# Patient Record
Sex: FEMALE | URBAN - METROPOLITAN AREA
[De-identification: names, ages, dates, MRNs, and addresses within clinical notes are randomized per-mention and may not be internally consistent; named-entity substitution may affect disease eponyms.]

---

## 2018-02-18 ENCOUNTER — EMERGENCY (EMERGENCY)
Facility: HOSPITAL | Age: 33
LOS: 0 days | Discharge: HOME | End: 2018-02-18
Attending: EMERGENCY MEDICINE

## 2018-02-18 VITALS
TEMPERATURE: 99 F | SYSTOLIC BLOOD PRESSURE: 150 MMHG | HEART RATE: 90 BPM | DIASTOLIC BLOOD PRESSURE: 82 MMHG | RESPIRATION RATE: 16 BRPM | OXYGEN SATURATION: 99 %

## 2018-02-18 VITALS
DIASTOLIC BLOOD PRESSURE: 55 MMHG | HEART RATE: 72 BPM | SYSTOLIC BLOOD PRESSURE: 103 MMHG | TEMPERATURE: 98 F | RESPIRATION RATE: 17 BRPM

## 2018-02-18 DIAGNOSIS — Z98.2 PRESENCE OF CEREBROSPINAL FLUID DRAINAGE DEVICE: ICD-10-CM

## 2018-02-18 DIAGNOSIS — F17.210 NICOTINE DEPENDENCE, CIGARETTES, UNCOMPLICATED: ICD-10-CM

## 2018-02-18 DIAGNOSIS — R51 HEADACHE: ICD-10-CM

## 2018-02-18 DIAGNOSIS — Z98.890 OTHER SPECIFIED POSTPROCEDURAL STATES: Chronic | ICD-10-CM

## 2018-02-18 LAB
ANION GAP SERPL CALC-SCNC: 5 MMOL/L — LOW (ref 7–14)
BUN SERPL-MCNC: 8 MG/DL — LOW (ref 10–20)
CALCIUM SERPL-MCNC: 9 MG/DL — SIGNIFICANT CHANGE UP (ref 8.5–10.1)
CHLORIDE SERPL-SCNC: 106 MMOL/L — SIGNIFICANT CHANGE UP (ref 98–110)
CO2 SERPL-SCNC: 27 MMOL/L — SIGNIFICANT CHANGE UP (ref 17–32)
CREAT SERPL-MCNC: 1 MG/DL — SIGNIFICANT CHANGE UP (ref 0.7–1.5)
GLUCOSE SERPL-MCNC: 89 MG/DL — SIGNIFICANT CHANGE UP (ref 70–110)
HCT VFR BLD CALC: 39.4 % — SIGNIFICANT CHANGE UP (ref 37–47)
HGB BLD-MCNC: 12.9 G/DL — LOW (ref 14–18)
MCHC RBC-ENTMCNC: 30.2 PG — SIGNIFICANT CHANGE UP (ref 27–31)
MCHC RBC-ENTMCNC: 32.7 G/DL — SIGNIFICANT CHANGE UP (ref 32–37)
MCV RBC AUTO: 92.3 FL — HIGH (ref 81–91)
NRBC # BLD: 0 /100 WBCS — SIGNIFICANT CHANGE UP (ref 0–0)
PLATELET # BLD AUTO: 242 K/UL — SIGNIFICANT CHANGE UP (ref 130–400)
POTASSIUM SERPL-MCNC: 5.1 MMOL/L — HIGH (ref 3.5–5)
POTASSIUM SERPL-SCNC: 5.1 MMOL/L — HIGH (ref 3.5–5)
RBC # BLD: 4.27 M/UL — SIGNIFICANT CHANGE UP (ref 4.2–5.4)
RBC # FLD: 12.6 % — SIGNIFICANT CHANGE UP (ref 11.5–14.5)
SODIUM SERPL-SCNC: 138 MMOL/L — SIGNIFICANT CHANGE UP (ref 135–146)
WBC # BLD: 5.47 K/UL — SIGNIFICANT CHANGE UP (ref 4.8–10.8)
WBC # FLD AUTO: 5.47 K/UL — SIGNIFICANT CHANGE UP (ref 4.8–10.8)

## 2018-02-18 RX ORDER — SODIUM CHLORIDE 9 MG/ML
1000 INJECTION INTRAMUSCULAR; INTRAVENOUS; SUBCUTANEOUS ONCE
Qty: 0 | Refills: 0 | Status: COMPLETED | OUTPATIENT
Start: 2018-02-18 | End: 2018-02-18

## 2018-02-18 RX ORDER — METOCLOPRAMIDE HCL 10 MG
10 TABLET ORAL ONCE
Qty: 0 | Refills: 0 | Status: COMPLETED | OUTPATIENT
Start: 2018-02-18 | End: 2018-02-18

## 2018-02-18 RX ORDER — METOCLOPRAMIDE HCL 10 MG
1 TABLET ORAL
Qty: 45 | Refills: 0 | OUTPATIENT
Start: 2018-02-18 | End: 2018-03-04

## 2018-02-18 RX ADMIN — Medication 104 MILLIGRAM(S): at 13:07

## 2018-02-18 RX ADMIN — SODIUM CHLORIDE 2000 MILLILITER(S): 9 INJECTION INTRAMUSCULAR; INTRAVENOUS; SUBCUTANEOUS at 13:07

## 2018-02-18 NOTE — ED ADULT NURSE NOTE - OBJECTIVE STATEMENT
as per pt I have had migraines  on and off. pt states she has history of shunt due to ocular hypertension.

## 2018-02-18 NOTE — CONSULT NOTE ADULT - SUBJECTIVE AND OBJECTIVE BOX
HISTORY OF PRESENT ILLNESS: 31 Y/O female with Pseudo tumor cerebri, s/p VPS placement 2/3/16, patient has been doing well, started having H/A, pain behind eyes, vomiting, had a fever 101 on Friday only, was neg for flu, patient is presently feeling better, spoke to Dr Bryan and is in the ED for HCT and shunt series    PAST MEDICAL & SURGICAL HISTORY:  psuedo tumor cerebri, s/p VPS 2/3/16 by Dr Bryan    [facility]- VPS 2016, breast reduction, Csection    Allergies    Diamox (Unknown)    Intolerances    MEDICATIONS:  Seroquel 100mg HS, Klonopin 1 mg od PRN    Vital Signs Last 24 Hrs  T(C): 37.1 (18 Feb 2018 11:58), Max: 37.1 (18 Feb 2018 11:58)  T(F): 98.7 (18 Feb 2018 11:58), Max: 98.7 (18 Feb 2018 11:58)  HR: 90 (18 Feb 2018 11:58) (90 - 90)  BP: 150/82 (18 Feb 2018 11:58) (150/82 - 150/82)  BP(mean): --  RR: 16 (18 Feb 2018 11:58) (16 - 16)  SpO2: 99% (18 Feb 2018 11:58) (99% - 99%)    PHYSICAL EXAM:  Constitutional: NAD, looks healthy  Neurological: awake, alert, NAD, verbalizing well, CNI, memory good, PERRLA ,EOMI, no drift, coordination good, strength 5/5 through out, no neck pain, no nuchal rigidity, VPS wounds well healed, valve refills slowly, nontender    LABS: none available at this time          RADIOLOGY & ADDITIONAL STUDIES: pending    Assessment: r/o VPS malfunction      Plan: HCT, shunt series

## 2018-02-18 NOTE — ED PROVIDER NOTE - PHYSICAL EXAMINATION
VITAL SIGNS: I have reviewed nursing notes and confirm.  CONSTITUTIONAL: uncomfortable appearing due to pain,  in no acute distress.  SKIN: Skin exam is warm and dry, no acute rash.  HEAD: Normocephalic; atraumatic.  EYES: PERRL, EOM intact; pink conjunctiva. + photophobia. No phonophobia.   ENT: MMM.  NECK: Supple; non tender.  CARD: S1, S2 normal; no murmurs, gallops, or rubs. Regular rate and rhythm.  RESP: No wheezes, rales or rhonchi. CTAB  EXT: No calf tenderness. No pedal edema.   NEURO: Alert, oriented. CN II-XII intact. 5/5 strength b/l of upper and lower extremities. Sensation intact. No dysmetria. Ambulating with normal gait. - pronator drift. - romberg  PSYCH: Cooperative, appropriate.

## 2018-02-18 NOTE — ED ADULT NURSE NOTE - CHPI ED SYMPTOMS NEG
no loss of consciousness/no numbness/no change in level of consciousness/no vomiting/no fever/no blurred vision/no confusion

## 2018-02-18 NOTE — ED PROVIDER NOTE - PROGRESS NOTE DETAILS
discussed with neurosurgery Plan: UA, fluids, Reglan, consult neurology, CT head and reassess. seen by attending at bedside. shunt adjusted pt comftorable with outpt dc. advised f/u with her own neurooptho

## 2018-02-18 NOTE — ED PROVIDER NOTE - OBJECTIVE STATEMENT
31 y/o F with PMH of migraines, breast reduction, shunt placement p/w HA associated with feeling “water is pouring out of her ears” x 3 days. Pt reports one episode of NBNB vomiting x 1 day. LMP last week. Pt states that she was tested for the flu at a different hospital which was negative.  No fever or chills. No nausea or diarrhea.

## 2018-02-18 NOTE — ED PROVIDER NOTE - NS ED ROS FT
Constitutional: See HPI. No fever/chills.  Eyes: No visual changes, eye pain or discharge.  ENT: No hearing changes, pain, discharge or infections.  Neck: No neck pain or stiffness.  Cardiac: No chest pain, SOB or edema. No chest pain with exertion.  Respiratory: No cough or respiratory distress.   GI: No nausea or diarrhea or abdominal pain. + NBNB vomiting.   : No dysuria, frequency or burning.   MS: No myalgia, muscle weakness, joint pain or back pain.  Neuro: + HA.   Skin: No rash.  Except as documented in the HPI, all other systems are negative.

## 2018-02-18 NOTE — CONSULT NOTE ADULT - ATTENDING COMMENTS
Pt with less N/V s/p reglan. No visual changes. She saw Dr. Brink in January who felt she had papilledema. VPS continuous on X-ray. CTH with small vents, shunt in position. Shunt pumps and refills well. VPS interogated current setting 2.0. Strata dialed to 1.5. I will see her in the office this week for f/u. No further neurosurgical intervention.

## 2018-02-21 ENCOUNTER — APPOINTMENT (OUTPATIENT)
Dept: NEUROSURGERY | Facility: CLINIC | Age: 33
End: 2018-02-21
Payer: MEDICAID

## 2018-02-21 PROBLEM — Z00.00 ENCOUNTER FOR PREVENTIVE HEALTH EXAMINATION: Status: ACTIVE | Noted: 2018-02-21

## 2018-02-21 PROCEDURE — 99212 OFFICE O/P EST SF 10 MIN: CPT

## 2018-03-27 ENCOUNTER — TRANSCRIPTION ENCOUNTER (OUTPATIENT)
Age: 33
End: 2018-03-27

## 2018-04-23 ENCOUNTER — APPOINTMENT (OUTPATIENT)
Dept: NEUROSURGERY | Facility: CLINIC | Age: 33
End: 2018-04-23

## 2020-09-22 ENCOUNTER — TRANSCRIPTION ENCOUNTER (OUTPATIENT)
Age: 35
End: 2020-09-22

## 2020-12-14 NOTE — ED ADULT NURSE NOTE - PSH
"""S/P IOL OU: OD: Symfony ZXR00 +15. 00Femto/ArcsOmidria.  OS: Tecnis MF ZLB00 +18.00 "" History of bilateral breast reduction surgery

## 2022-06-23 ENCOUNTER — NON-APPOINTMENT (OUTPATIENT)
Age: 37
End: 2022-06-23

## 2022-06-23 PROBLEM — G44.021 CHRONIC CLUSTER HEADACHE, INTRACTABLE: Chronic | Status: ACTIVE | Noted: 2018-02-18

## 2022-07-20 ENCOUNTER — APPOINTMENT (OUTPATIENT)
Dept: NEUROSURGERY | Facility: CLINIC | Age: 37
End: 2022-07-20

## 2022-07-20 VITALS — BODY MASS INDEX: 33.29 KG/M2 | HEIGHT: 64 IN | WEIGHT: 195 LBS

## 2022-07-20 DIAGNOSIS — G93.2 BENIGN INTRACRANIAL HYPERTENSION: ICD-10-CM

## 2022-07-20 DIAGNOSIS — Z87.891 PERSONAL HISTORY OF NICOTINE DEPENDENCE: ICD-10-CM

## 2022-07-20 DIAGNOSIS — Z78.9 OTHER SPECIFIED HEALTH STATUS: ICD-10-CM

## 2022-07-20 PROCEDURE — 99203 OFFICE O/P NEW LOW 30 MIN: CPT

## 2022-07-20 RX ORDER — CLONAZEPAM 1 MG/1
1 TABLET ORAL
Refills: 0 | Status: ACTIVE | COMMUNITY

## 2022-07-20 RX ORDER — QUETIAPINE 400 MG/1
TABLET, FILM COATED ORAL
Refills: 0 | Status: ACTIVE | COMMUNITY

## 2022-07-20 RX ORDER — DEXTROAMPHETAMINE SULFATE, DEXTROAMPHETAMINE SACCHARATE, AMPHETAMINE SULFATE AND AMPHETAMINE ASPARTATE 1.25; 1.25; 1.25; 1.25 MG/1; MG/1; MG/1; MG/1
5 CAPSULE, EXTENDED RELEASE ORAL
Refills: 0 | Status: ACTIVE | COMMUNITY

## 2022-07-20 NOTE — ASSESSMENT
[FreeTextEntry1] : We have had a thorough discussion regarding her current condition. Ms. COLMENARES will proceed with an updated CT head noncontrast and a shunt series xray. She will follow up with Dr. Bryan and DIEGO once testing is completed. \par

## 2022-07-20 NOTE — HISTORY OF PRESENT ILLNESS
[de-identified] : Ms. COLMENARES is s/p  shunt on 2/2016 for pseudotumor cerebri. Postop did well. Shunt is set to 1.5 (Strata). Setting was confirmed today. \par \par Over the past two months, she has developed intermittent headaches, tinnitus, and photophobia. No vomiting or visual changes. She was seen by her ophthalmologist 2 weeks ago. Eye exam was negative for papilledema per patient. Shunt pumps and refills well. Neuro intact on exam today. \par \par \par \par \par \par

## 2022-08-15 ENCOUNTER — APPOINTMENT (OUTPATIENT)
Dept: NEUROSURGERY | Facility: CLINIC | Age: 37
End: 2022-08-15

## 2022-09-20 ENCOUNTER — APPOINTMENT (OUTPATIENT)
Dept: NEUROSURGERY | Facility: CLINIC | Age: 37
End: 2022-09-20